# Patient Record
Sex: MALE | Race: BLACK OR AFRICAN AMERICAN | NOT HISPANIC OR LATINO
[De-identification: names, ages, dates, MRNs, and addresses within clinical notes are randomized per-mention and may not be internally consistent; named-entity substitution may affect disease eponyms.]

---

## 2021-08-15 ENCOUNTER — NON-APPOINTMENT (OUTPATIENT)
Age: 31
End: 2021-08-15

## 2021-08-16 ENCOUNTER — NON-APPOINTMENT (OUTPATIENT)
Age: 31
End: 2021-08-16

## 2021-08-16 ENCOUNTER — APPOINTMENT (OUTPATIENT)
Dept: INTERNAL MEDICINE | Facility: CLINIC | Age: 31
End: 2021-08-16
Payer: COMMERCIAL

## 2021-08-16 VITALS
HEART RATE: 77 BPM | BODY MASS INDEX: 34.4 KG/M2 | WEIGHT: 227 LBS | HEIGHT: 68 IN | SYSTOLIC BLOOD PRESSURE: 125 MMHG | OXYGEN SATURATION: 95 % | DIASTOLIC BLOOD PRESSURE: 81 MMHG | TEMPERATURE: 97.4 F

## 2021-08-16 DIAGNOSIS — Z82.49 FAMILY HISTORY OF ISCHEMIC HEART DISEASE AND OTHER DISEASES OF THE CIRCULATORY SYSTEM: ICD-10-CM

## 2021-08-16 DIAGNOSIS — Z00.00 ENCOUNTER FOR GENERAL ADULT MEDICAL EXAMINATION W/OUT ABNORMAL FINDINGS: ICD-10-CM

## 2021-08-16 DIAGNOSIS — Z11.59 ENCOUNTER FOR SCREENING FOR OTHER VIRAL DISEASES: ICD-10-CM

## 2021-08-16 DIAGNOSIS — Z83.3 FAMILY HISTORY OF DIABETES MELLITUS: ICD-10-CM

## 2021-08-16 DIAGNOSIS — Z80.0 FAMILY HISTORY OF MALIGNANT NEOPLASM OF DIGESTIVE ORGANS: ICD-10-CM

## 2021-08-16 DIAGNOSIS — Z82.1 FAMILY HISTORY OF BLINDNESS AND VISUAL LOSS: ICD-10-CM

## 2021-08-16 DIAGNOSIS — Z78.9 OTHER SPECIFIED HEALTH STATUS: ICD-10-CM

## 2021-08-16 DIAGNOSIS — Z11.3 ENCOUNTER FOR SCREENING FOR INFECTIONS WITH A PREDOMINANTLY SEXUAL MODE OF TRANSMISSION: ICD-10-CM

## 2021-08-16 DIAGNOSIS — J30.2 OTHER SEASONAL ALLERGIC RHINITIS: ICD-10-CM

## 2021-08-16 PROCEDURE — 36415 COLL VENOUS BLD VENIPUNCTURE: CPT

## 2021-08-16 PROCEDURE — 99385 PREV VISIT NEW AGE 18-39: CPT | Mod: 25

## 2021-08-16 NOTE — HISTORY OF PRESENT ILLNESS
[FreeTextEntry1] : annual exam/est care [de-identified] : Mr. Webster is a 31 Y/M \par \par -lower back tightness,non-radiating\par -since doing lower back extension 4 months ago\par -and left leg tightness connected  \par - improves w/ movement\par - worse w/ prolonged sitting\par \par HCM\par - would like covid vax, is waiting for location that he and his fiancee can go to\par - does not trust pharmacy, had bad experience

## 2021-08-16 NOTE — PHYSICAL EXAM
[No Acute Distress] : no acute distress [Well Nourished] : well nourished [Well Developed] : well developed [Well-Appearing] : well-appearing [Normal Sclera/Conjunctiva] : normal sclera/conjunctiva [EOMI] : extraocular movements intact [Normal Outer Ear/Nose] : the outer ears and nose were normal in appearance [No Respiratory Distress] : no respiratory distress  [No Accessory Muscle Use] : no accessory muscle use [Clear to Auscultation] : lungs were clear to auscultation bilaterally [Normal Rate] : normal rate  [Regular Rhythm] : with a regular rhythm [Normal S1, S2] : normal S1 and S2 [No Murmur] : no murmur heard [No Edema] : there was no peripheral edema [No Extremity Clubbing/Cyanosis] : no extremity clubbing/cyanosis [Soft] : abdomen soft [Non Tender] : non-tender [Non-distended] : non-distended [No Masses] : no abdominal mass palpated [No HSM] : no HSM [No CVA Tenderness] : no CVA  tenderness [No Spinal Tenderness] : no spinal tenderness [No Joint Swelling] : no joint swelling [Grossly Normal Strength/Tone] : grossly normal strength/tone [No Rash] : no rash [Coordination Grossly Intact] : coordination grossly intact [No Focal Deficits] : no focal deficits [Normal Gait] : normal gait [Normal Affect] : the affect was normal [Alert and Oriented x3] : oriented to person, place, and time [Normal Mood] : the mood was normal [Normal Insight/Judgement] : insight and judgment were intact

## 2021-08-17 LAB
25(OH)D3 SERPL-MCNC: 24 NG/ML
ALBUMIN SERPL ELPH-MCNC: 4.2 G/DL
ALP BLD-CCNC: 117 U/L
ALT SERPL-CCNC: 20 U/L
ANION GAP SERPL CALC-SCNC: 10 MMOL/L
APPEARANCE: CLEAR
AST SERPL-CCNC: 27 U/L
BASOPHILS # BLD AUTO: 0.03 K/UL
BASOPHILS NFR BLD AUTO: 0.6 %
BILIRUB SERPL-MCNC: 0.3 MG/DL
BILIRUBIN URINE: NEGATIVE
BLOOD URINE: NEGATIVE
BUN SERPL-MCNC: 11 MG/DL
C TRACH RRNA SPEC QL NAA+PROBE: NOT DETECTED
CALCIUM SERPL-MCNC: 9.6 MG/DL
CHLORIDE SERPL-SCNC: 104 MMOL/L
CHOLEST SERPL-MCNC: 178 MG/DL
CO2 SERPL-SCNC: 25 MMOL/L
COLOR: YELLOW
COVID-19 SPIKE DOMAIN ANTIBODY INTERPRETATION: POSITIVE
CREAT SERPL-MCNC: 1.3 MG/DL
EOSINOPHIL # BLD AUTO: 0.09 K/UL
EOSINOPHIL NFR BLD AUTO: 1.9 %
ESTIMATED AVERAGE GLUCOSE: 120 MG/DL
GLUCOSE QUALITATIVE U: NEGATIVE
GLUCOSE SERPL-MCNC: 95 MG/DL
HBA1C MFR BLD HPLC: 5.8 %
HCT VFR BLD CALC: 49.2 %
HDLC SERPL-MCNC: 44 MG/DL
HGB BLD-MCNC: 15.5 G/DL
HIV1+2 AB SPEC QL IA.RAPID: NONREACTIVE
IMM GRANULOCYTES NFR BLD AUTO: 0.2 %
KETONES URINE: NEGATIVE
LDLC SERPL CALC-MCNC: 111 MG/DL
LEUKOCYTE ESTERASE URINE: NEGATIVE
LYMPHOCYTES # BLD AUTO: 1.49 K/UL
LYMPHOCYTES NFR BLD AUTO: 31.8 %
MAN DIFF?: NORMAL
MCHC RBC-ENTMCNC: 29.5 PG
MCHC RBC-ENTMCNC: 31.5 GM/DL
MCV RBC AUTO: 93.5 FL
MONOCYTES # BLD AUTO: 0.39 K/UL
MONOCYTES NFR BLD AUTO: 8.3 %
N GONORRHOEA RRNA SPEC QL NAA+PROBE: NOT DETECTED
NEUTROPHILS # BLD AUTO: 2.68 K/UL
NEUTROPHILS NFR BLD AUTO: 57.2 %
NITRITE URINE: NEGATIVE
NONHDLC SERPL-MCNC: 134 MG/DL
PH URINE: 6
PLATELET # BLD AUTO: 229 K/UL
POTASSIUM SERPL-SCNC: 4.4 MMOL/L
PROT SERPL-MCNC: 6.8 G/DL
PROTEIN URINE: NORMAL
PSA SERPL-MCNC: 0.91 NG/ML
RBC # BLD: 5.26 M/UL
RBC # FLD: 13.6 %
SARS-COV-2 AB SERPL IA-ACNC: 143 U/ML
SODIUM SERPL-SCNC: 139 MMOL/L
SOURCE AMPLIFICATION: NORMAL
SPECIFIC GRAVITY URINE: 1.02
T PALLIDUM AB SER QL IA: NEGATIVE
TRIGL SERPL-MCNC: 116 MG/DL
TSH SERPL-ACNC: 0.78 UIU/ML
UROBILINOGEN URINE: NORMAL
WBC # FLD AUTO: 4.69 K/UL

## 2021-08-30 LAB — HLA-B27 RELATED AG QL: NEGATIVE

## 2021-09-01 ENCOUNTER — APPOINTMENT (OUTPATIENT)
Dept: ORTHOPEDIC SURGERY | Facility: CLINIC | Age: 31
End: 2021-09-01
Payer: COMMERCIAL

## 2021-09-01 PROCEDURE — 99203 OFFICE O/P NEW LOW 30 MIN: CPT

## 2021-09-01 NOTE — HISTORY OF PRESENT ILLNESS
[de-identified] : Reilly is a 30 yo who comes in with low back pain that started around May 1, 2021.  He felt that initially doing back extension exercises and felt a tightness in the low back.  When he did the exercises again he developed more tightness in the back which now has been relatively constant.  The tightness or pain can get worse when he carries any weight or does weight lifting.  He never had any prior back issues.  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  Pain can be an 8 out of 10 at times carrying something heavy.  It is better lying down and applying heat.  He has not done any physical therapy or had any other treatment.  No pain down the legs although sometimes his knees are little sore and achy.  He had gained weight during Covid and decided to start running and ran 8 miles without any base and had some knee and back discomfort after that a while ago.  He has not been running recently.  Running can aggravate the back now too.  He has not been taking any medication.

## 2021-09-01 NOTE — PHYSICAL EXAM
[LE] : Sensory: Intact in bilateral lower extremities [Normal RLE] : Right Lower Extremity: No scars, rashes, lesions, ulcers, skin intact [Normal LLE] : Left Lower Extremity: No scars, rashes, lesions, ulcers, skin intact [Normal Touch] : sensation intact for touch [Normal] : Alert and in no acute distress [de-identified] : Low back and lower extremities\par He walks with a normal gait.\par He can walk on his toes and heels.\par Flatfoot deformity bilaterally.\par Deep tendon reflexes 2+ bilateral patella and Achilles.\par Negative straight leg raise to 80 degrees bilaterally without pain.\par Negative Jerrod.\par No significant tenderness in the back although where he feels pain is around the L4 level.\par Forward flexion he can reach the floor without pain or difficulty.\par Sensation is intact.  [de-identified] : Overweight [de-identified] : \par I reviewed x-rays taken at [facility] radiology of the lumbar spine AP and lateral views on August 16, 2021 which are relatively unremarkable.  There may be a very subtle curvature on the AP view.  On the lateral view the disc spaces are well-maintained and there is normal alignment and no spondylolisthesis or spondylolysis visualized

## 2021-09-01 NOTE — ASSESSMENT
[FreeTextEntry1] : 31-year-old with low back pain over the last several months that started with doing some extension exercises but also had done some running.\par There are no neurologic deficits or radicular symptoms.\par I recommended first trying a good course of physical therapy which she is starting later today.  Heat and ice.  I recommended taking an anti-inflammatory for couple weeks and prescribed diclofenac 50 mg twice daily with meals.\par He should avoid any heavy lifting or any activity that causes any pain during or after.  He should do very gentle nonstrenuous exercises for the time being and low impact.\par If the pain is getting worse with therapy or if it does not get better then I would have him go for an MRI.\par We also talked about building up slowly when he does  a new exercise such as running to try to avoid overuse injuries.\par Follow-up in about 6 weeks if he is not better.

## 2021-10-13 ENCOUNTER — APPOINTMENT (OUTPATIENT)
Dept: ORTHOPEDIC SURGERY | Facility: CLINIC | Age: 31
End: 2021-10-13
Payer: COMMERCIAL

## 2021-10-13 DIAGNOSIS — M54.50 LOW BACK PAIN, UNSPECIFIED: ICD-10-CM

## 2021-10-13 PROCEDURE — 99213 OFFICE O/P EST LOW 20 MIN: CPT

## 2021-10-13 NOTE — HISTORY OF PRESENT ILLNESS
[de-identified] : Reilly is a 30 yo who comes in for f/u for the low back pain that started around May 1, 2021.  He felt that initially doing back extension exercises and felt a tightness in the low back.\par His back has gotten somewhat better with the physical therapy and time.  He took a little bit of the diclofenac which I had prescribed but it made him tired so he only took it for several days.  With the therapy his pain is more localized to the low back a little bit more on the left than right side.  He sometimes feels some stiffness in his knees that he thought might be related.\par No numbness or tingling or radiating pain otherwise.\par Standing leaning forward slightly can be very painful intermittently.

## 2021-10-13 NOTE — PHYSICAL EXAM
[LE] : Sensory: Intact in bilateral lower extremities [Normal RLE] : Right Lower Extremity: No scars, rashes, lesions, ulcers, skin intact [Normal LLE] : Left Lower Extremity: No scars, rashes, lesions, ulcers, skin intact [Normal Touch] : sensation intact for touch [Normal] : Alert and in no acute distress [de-identified] : Low back and lower extremities\par He walks with a normal gait.\par He can walk on his toes and heels.\par Flatfoot deformity bilaterally.\par Lumbar range of motion is with forward flexion to his ankles without significant pain.\par Deep tendon reflexes 2+ bilateral patella and Achilles.\par Negative straight leg raise to 80 degrees bilaterally without pain.\par Negative Jerrod.\par No significant tenderness in the back although where he feels pain is around the L4 level.\par Sensation is intact.\par Bilateral knee range of motion 0 to 135 degrees.  No effusion.  No tenderness or pain.  Knees are stable [de-identified] : Overweight [de-identified] : \par x-rays taken at [facility] radiology of the lumbar spine AP and lateral views on August 16, 2021 were relatively unremarkable.  There may be a very subtle curvature on the AP view.  On the lateral view the disc spaces are well-maintained and there is normal alignment and no spondylolisthesis or spondylolysis visualized

## 2021-10-13 NOTE — ASSESSMENT
[FreeTextEntry1] : 31-year-old with low back pain over the past 5 months that started with doing some extension exercises but also had done some running.\par There are no neurologic deficits or radicular symptoms.\par The therapy is helping and he should continue with the physical therapy and home exercises.  Heat and ice.  He should be careful bending and lifting and avoid any aggravating position or activity.  Since he could not tolerate the diclofenac I prescribed Celebrex instead to take 1 a day for couple weeks to see if this works better.\par Otherwise he could try the diclofenac but just take it with dinner at night so if it makes him tired its not as much of an issue.\par He should not take both.\par Tylenol would be an alternative.\par He will follow-up after doing another 6 weeks of therapy if his pain is not gone in which case I would work it up further likely with an MRI.

## 2022-01-07 ENCOUNTER — NON-APPOINTMENT (OUTPATIENT)
Age: 32
End: 2022-01-07

## 2022-01-07 ENCOUNTER — APPOINTMENT (OUTPATIENT)
Dept: OPHTHALMOLOGY | Facility: CLINIC | Age: 32
End: 2022-01-07
Payer: COMMERCIAL

## 2022-01-07 PROCEDURE — 92002 INTRM OPH EXAM NEW PATIENT: CPT

## 2022-01-07 PROCEDURE — 92083 EXTENDED VISUAL FIELD XM: CPT

## 2022-01-07 PROCEDURE — 92134 CPTRZ OPH DX IMG PST SGM RTA: CPT

## 2022-01-07 PROCEDURE — 76514 ECHO EXAM OF EYE THICKNESS: CPT

## 2022-01-07 PROCEDURE — 92015 DETERMINE REFRACTIVE STATE: CPT

## 2022-06-10 ENCOUNTER — APPOINTMENT (OUTPATIENT)
Dept: INTERNAL MEDICINE | Facility: CLINIC | Age: 32
End: 2022-06-10
Payer: COMMERCIAL

## 2022-06-10 VITALS
RESPIRATION RATE: 16 BRPM | OXYGEN SATURATION: 100 % | WEIGHT: 215 LBS | SYSTOLIC BLOOD PRESSURE: 117 MMHG | HEART RATE: 75 BPM | HEIGHT: 68 IN | TEMPERATURE: 98 F | BODY MASS INDEX: 32.58 KG/M2 | DIASTOLIC BLOOD PRESSURE: 72 MMHG

## 2022-06-10 DIAGNOSIS — S54.02XS INJURY OF ULNAR NERVE AT FOREARM LEVEL, LEFT ARM, SEQUELA: ICD-10-CM

## 2022-06-10 PROCEDURE — 99214 OFFICE O/P EST MOD 30 MIN: CPT

## 2022-06-10 RX ORDER — DICLOFENAC SODIUM 50 MG/1
50 TABLET, DELAYED RELEASE ORAL TWICE DAILY
Qty: 30 | Refills: 1 | Status: COMPLETED | COMMUNITY
Start: 2021-09-01 | End: 2022-06-10

## 2022-06-10 RX ORDER — ALBUTEROL SULFATE 90 UG/1
108 (90 BASE) INHALANT RESPIRATORY (INHALATION)
Qty: 1 | Refills: 3 | Status: COMPLETED | COMMUNITY
Start: 2021-08-16 | End: 2022-06-10

## 2022-06-10 RX ORDER — CELECOXIB 200 MG/1
200 CAPSULE ORAL DAILY
Qty: 20 | Refills: 0 | Status: COMPLETED | COMMUNITY
Start: 2021-10-13 | End: 2022-06-10

## 2022-06-10 NOTE — PHYSICAL EXAM
[Normal] : no acute distress, well nourished, well developed and well-appearing [No Respiratory Distress] : no respiratory distress  [de-identified] : LEFT arm in a brace, decreased strength and sensation over left 3,4,5th digits
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
## 2022-10-25 ENCOUNTER — APPOINTMENT (OUTPATIENT)
Dept: PSYCHIATRY | Facility: CLINIC | Age: 32
End: 2022-10-25

## 2022-10-25 DIAGNOSIS — F43.23 ADJUSTMENT DISORDER WITH MIXED ANXIETY AND DEPRESSED MOOD: ICD-10-CM

## 2022-10-25 PROCEDURE — 90791 PSYCH DIAGNOSTIC EVALUATION: CPT | Mod: 95

## 2022-10-28 PROBLEM — F43.23 ADJUSTMENT DISORDER WITH MIXED ANXIETY AND DEPRESSED MOOD: Status: ACTIVE | Noted: 2022-10-28

## 2022-10-28 NOTE — PLAN
[Admit to Program     (Add Program Admission information to a new column in the Admit/Discharge Flowsheet)] : Admit to program [Every ___ week(s)] : Psychotherapy: Every [unfilled] week(s) [Individual Therapy] : Individual Therapy [FreeTextEntry4] : Pt will be further assessed for PTSD and other symptoms to confirm whether he meets criteria for PTSD or other comorbidities (MDD).  Will collaboratively determine plan for treatment following comprehensive PTSD assessment.

## 2022-10-28 NOTE — FAMILY HISTORY
[FreeTextEntry1] : Pt reported that he was raised by his mother who is legally blind. He stated that at a young age he was always watch for his own and her safety. He stated that because of this, he has engaged in more vigilant behavior around his safety (checking door/window locks, watching the door, checking for exits). He reported that he currently lives with his fiance in [place], [place]. He stated that they are planning to get  in [place] in November.

## 2022-10-28 NOTE — SOCIAL HISTORY
[FreeTextEntry1] : Pt reported that he has friends with whom he spends time with however, he noted that he does not feel like he is able to talk about what happened to him with them.
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
## 2022-10-28 NOTE — PHYSICAL EXAM
[Well groomed] : well groomed [Average] : average [Cooperative] : cooperative [Euthymic] : euthymic [Full] : full [Clear] : clear [Linear/Goal Directed] : linear/goal directed [None] : none [None Reported] : none reported [WNL] : within normal limits [4] : 4 [5] : 5 [1] : 1 [6] : 6 [0] : 0 [FreeTextEntry1] : 25
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
## 2022-10-28 NOTE — RISK ASSESSMENT
[Clinical Interview] : Clinical Interview [No] : No [No known suicide factors] : No known suicide factors [Depressed mood/Anhedonia] : depressed mood/anhedonia [Identifies reasons for living] : identifies reasons for living [Supportive social network of family or friends] : supportive social network of family or friends [Ability to cope with stress] : ability to cope with stress [Responsibility to children, family, or others] : responsibility to children, family, or others [Engaged in work or school] : engaged in work or school [None in the patient's lifetime] : None in the patient's lifetime [None Known] : none known [No known risk factors] : No known risk factors

## 2022-10-28 NOTE — END OF VISIT
[Time Spent: ___ minutes] : I have spent [unfilled] minutes of time on the encounter. [Licensed Clinician] : Licensed Clinician

## 2022-11-01 ENCOUNTER — APPOINTMENT (OUTPATIENT)
Dept: PSYCHIATRY | Facility: CLINIC | Age: 32
End: 2022-11-01

## 2022-11-01 PROCEDURE — 90837 PSYTX W PT 60 MINUTES: CPT | Mod: 95

## 2022-11-01 NOTE — END OF VISIT
[Individual Psychotherapy for 53+ minutes] : Individual Psychotherapy for 53+ minutes  [Teletherapy Service Provided] : The services provided in this session were delivered via tele-therapy [Licensed Clinician] : Licensed Clinician

## 2022-11-01 NOTE — PLAN
[FreeTextEntry2] : This writer and pt collaboratively identified the following psychotherapy goals:\par Problem 1: PTSD sx's (i.e., intrusive, unwanted trauma-related thoughts and images; trauma-related hypervigilance; trauma-related alterations in mood)\par Goal 1: Reduce PTSD sx's by 20%, as measured by PCL-5\par Problem 2: Trauma-related fear and anger\par Goal 1: process emotions related to trauma event\par Goal 2: better understand how trauma event has affected current functioning and develop coping strategies to improve functioning. \par Goal 3: Determine after rapport has been established if tf-ebp (CPT, PE) is indicated/if pt is interested in engaging. [Psychoeducation] : Psychoeducation  [de-identified] : This session focused on further assessing pt's presence of trauma-related symptoms by completing the CAPS-5 for PTSD. Upon completion of assessment measure, it is indicated that pt is positive for PTSD. This writer provided psycho-education and feedback to pt regarding results of the assessment. Pt indicated that this was not a surprise and that this confirms what he has been feeling. This writer educated pt on some of the common responses to traumatic events, and how individuals often recover or get "stuck" in recovery and develop PTSD. Informed pt that avoidance is a primary factor in development of PTSD and discussed form of treatment (CPT, PE). Pt indicated uncertainty around which EBP he was more interested in. This writer suggested that they continue to meet weekly after his wedding to establish rapport and work on coping strategies related to his mood, hypervigilance, and feelings of anger/fear. Pt agreed with this plan. Will continue to assess PTSD symptoms with PCL-5, and will determine after several sessions which treatment is indicated. [Return in ____ week(s)] : Return in [unfilled] week(s) [FreeTextEntry1] : Pt will engage in weekly psychotherapy sessions to target PTSD sx's, specifically alterations in mood, hypervigilance, and processing trauma-related thoughts/emotions (fear, anger).

## 2022-11-01 NOTE — PHYSICAL EXAM
[Well groomed] : well groomed [Average] : average [Cooperative] : cooperative [Depressed] : depressed [Full] : full [Clear] : clear [Linear/Goal Directed] : linear/goal directed [None] : none [None Reported] : none reported [WNL] : within normal limits

## 2022-11-01 NOTE — REASON FOR VISIT
[Home] : at home, [unfilled] , at the time of the visit. [Other Location: e.g. Home (Enter Location, City,State)___] : at [unfilled] [Self] : self [FreeTextEntry2] : Reilly Webster [Patient] : Patient

## 2022-11-15 ENCOUNTER — APPOINTMENT (OUTPATIENT)
Dept: PSYCHIATRY | Facility: CLINIC | Age: 32
End: 2022-11-15

## 2022-11-15 PROCEDURE — 90837 PSYTX W PT 60 MINUTES: CPT | Mod: 95
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
## 2022-11-15 NOTE — REASON FOR VISIT
[Home] : at home, [unfilled] , at the time of the visit. [Other Location: e.g. Home (Enter Location, City,State)___] : at [unfilled] [Self] : self [This encounter was initiated by telehealth (audio with video) and converted to telephone (audio only) due to technical difficulties.] : This encounter was initiated by telehealth (audio with video) and converted to telephone (audio only) due to technical difficulties. [FreeTextEntry2] : Reilly Webster [Patient] : Patient

## 2022-11-15 NOTE — PHYSICAL EXAM
[Well groomed] : well groomed [Average] : average [Cooperative] : cooperative [Euthymic] : euthymic [Constricted] : constricted [Clear] : clear [Linear/Goal Directed] : linear/goal directed [None] : none [None Reported] : none reported [WNL] : within normal limits

## 2022-11-22 ENCOUNTER — APPOINTMENT (OUTPATIENT)
Dept: PSYCHIATRY | Facility: CLINIC | Age: 32
End: 2022-11-22

## 2022-11-22 PROCEDURE — 90837 PSYTX W PT 60 MINUTES: CPT | Mod: 95
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
## 2022-11-22 NOTE — PHYSICAL EXAM
[Well groomed] : well groomed [Average] : average [Cooperative] : cooperative [Euthymic] : euthymic [Full] : full [Clear] : clear [Linear/Goal Directed] : linear/goal directed [None] : none [None Reported] : none reported [WNL] : within normal limits

## 2022-11-29 ENCOUNTER — APPOINTMENT (OUTPATIENT)
Dept: PSYCHIATRY | Facility: CLINIC | Age: 32
End: 2022-11-29

## 2022-11-29 PROCEDURE — 90837 PSYTX W PT 60 MINUTES: CPT | Mod: 95
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
## 2022-11-29 NOTE — REASON FOR VISIT
[Home] : at home, [unfilled] , at the time of the visit. [Other Location: e.g. Home (Enter Location, City,State)___] : at [unfilled] [Self] : self [FreeTextEntry2] : Reilly Webster [Patient] : Patient [FreeTextEntry1] : PTSD symptoms

## 2022-12-06 ENCOUNTER — APPOINTMENT (OUTPATIENT)
Dept: PSYCHIATRY | Facility: CLINIC | Age: 32
End: 2022-12-06

## 2022-12-06 PROCEDURE — 90837 PSYTX W PT 60 MINUTES: CPT | Mod: 95
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
## 2022-12-07 NOTE — REASON FOR VISIT
[Home] : at home, [unfilled] , at the time of the visit. [Other Location: e.g. Home (Enter Location, City,State)___] : at [unfilled] [Self] : self [This encounter was initiated by telehealth (audio with video) and converted to telephone (audio only) due to technical difficulties.] : This encounter was initiated by telehealth (audio with video) and converted to telephone (audio only) due to technical difficulties. [FreeTextEntry2] : Reilly Webster [Patient] : Patient [FreeTextEntry1] : PTSD symptoms

## 2022-12-07 NOTE — PHYSICAL EXAM
[Well groomed] : well groomed [Average] : average [Cooperative] : cooperative [Depressed] : depressed no [Full] : full [Clear] : clear [Linear/Goal Directed] : linear/goal directed [None] : none [None Reported] : none reported [WNL] : within normal limits

## 2022-12-13 ENCOUNTER — APPOINTMENT (OUTPATIENT)
Dept: PSYCHIATRY | Facility: CLINIC | Age: 32
End: 2022-12-13

## 2022-12-13 PROCEDURE — 90834 PSYTX W PT 45 MINUTES: CPT | Mod: 95

## 2022-12-14 NOTE — REASON FOR VISIT
[Home] : at home, [unfilled] , at the time of the visit. [Other Location: e.g. Home (Enter Location, City,State)___] : at [unfilled] [Self] : self [FreeTextEntry2] : Reilly Webster [Patient] : Patient [FreeTextEntry1] : Symptoms related to PTSD

## 2022-12-14 NOTE — PHYSICAL EXAM
[Well groomed] : well groomed [Average] : average [Accelerated] : accelerated [Cooperative] : cooperative [Euthymic] : euthymic [Depressed] : depressed [Full] : full [Clear] : clear [Linear/Goal Directed] : linear/goal directed [None] : none [None Reported] : none reported [WNL] : within normal limits
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
## 2022-12-20 ENCOUNTER — APPOINTMENT (OUTPATIENT)
Dept: PSYCHIATRY | Facility: CLINIC | Age: 32
End: 2022-12-20

## 2022-12-20 PROCEDURE — 90837 PSYTX W PT 60 MINUTES: CPT | Mod: 95

## 2022-12-20 NOTE — PHYSICAL EXAM
[Well groomed] : well groomed [Average] : average [Cooperative] : cooperative [Depressed] : depressed [Constricted] : constricted [Clear] : clear [Linear/Goal Directed] : linear/goal directed [None] : none [None Reported] : none reported [WNL] : within normal limits
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
## 2023-01-03 ENCOUNTER — APPOINTMENT (OUTPATIENT)
Dept: PSYCHIATRY | Facility: CLINIC | Age: 33
End: 2023-01-03
Payer: COMMERCIAL

## 2023-01-03 PROCEDURE — 90837 PSYTX W PT 60 MINUTES: CPT | Mod: 95

## 2023-01-04 NOTE — REASON FOR VISIT
[Home] : at home, [unfilled] , at the time of the visit. [Other Location: e.g. Home (Enter Location, City,State)___] : at [unfilled] [Self] : self [FreeTextEntry2] : Reilly Webster [Patient] : Patient [FreeTextEntry1] : PTSD symptoms; stress related to work
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
## 2023-01-10 ENCOUNTER — APPOINTMENT (OUTPATIENT)
Dept: PSYCHIATRY | Facility: CLINIC | Age: 33
End: 2023-01-10

## 2023-01-12 ENCOUNTER — APPOINTMENT (OUTPATIENT)
Dept: OPHTHALMOLOGY | Facility: CLINIC | Age: 33
End: 2023-01-12

## 2023-01-17 ENCOUNTER — APPOINTMENT (OUTPATIENT)
Dept: PSYCHIATRY | Facility: CLINIC | Age: 33
End: 2023-01-17
Payer: COMMERCIAL

## 2023-01-17 PROCEDURE — 90837 PSYTX W PT 60 MINUTES: CPT | Mod: 95

## 2023-01-17 NOTE — PLAN
[FreeTextEntry2] : This writer and pt collaboratively identified the following psychotherapy goals:\par Problem 1: PTSD sx's (i.e., intrusive, unwanted trauma-related thoughts and images; trauma-related hypervigilance; trauma-related alterations in mood)\par Goal 1: Reduce PTSD sx's by 20%, as measured by PCL-5\par Problem 2: Trauma-related fear and anger\par Goal 1: process emotions related to trauma event\par Goal 2: better understand how trauma event has affected current functioning and develop coping strategies to improve functioning. \par Goal 3: Determine after rapport has been established if tf-ebp (CPT, PE) is indicated/if pt is interested in engaging. [Dialectical Behavior Therapy] : Dialectical Behavior Therapy  [de-identified] : THis writer met with pt for individual therapy. THis session focused on identifying thoughts and feelings related to being unhappy with job and expressing this to his wife. Pt stated that he struggles to express how he is feeling to others and himself. He indicated that he can do so in therapy with this provider, but struggles to find the words with others. He noted feeling like he has to over prepare, and have a script. Discussed ways that they could work on pt becoming more comfortable with modeling and role plays. Additionally, this writer encouraged pt to consider his goals for treatment and to discuss in next session if any adjustments need to be made. [Recommended Frequency of Visits: ____] : Recommended frequency of visits: [unfilled] [Return in ____ week(s)] : Return in [unfilled] week(s)

## 2023-01-24 ENCOUNTER — APPOINTMENT (OUTPATIENT)
Dept: PSYCHIATRY | Facility: CLINIC | Age: 33
End: 2023-01-24
Payer: COMMERCIAL

## 2023-01-24 PROCEDURE — 90837 PSYTX W PT 60 MINUTES: CPT | Mod: 95

## 2023-01-24 NOTE — PHYSICAL EXAM
[Well groomed] : well groomed [Average] : average [Cooperative] : cooperative [Euthymic] : euthymic [Irritable] : irritable [Constricted] : constricted [Clear] : clear [Linear/Goal Directed] : linear/goal directed [None] : none [None Reported] : none reported [WNL] : within normal limits
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
## 2023-01-31 ENCOUNTER — APPOINTMENT (OUTPATIENT)
Dept: PSYCHIATRY | Facility: CLINIC | Age: 33
End: 2023-01-31
Payer: COMMERCIAL

## 2023-01-31 PROCEDURE — 90837 PSYTX W PT 60 MINUTES: CPT | Mod: 95

## 2023-01-31 NOTE — PLAN
[FreeTextEntry2] : This writer and pt collaboratively identified the following psychotherapy goals:\par Problem 1: PTSD sx's (i.e., intrusive, unwanted trauma-related thoughts and images; trauma-related hypervigilance; trauma-related alterations in mood)\par Goal 1: Reduce PTSD sx's by 20%, as measured by PCL-5\par Problem 2: Trauma-related fear and anger\par Goal 1: process emotions related to trauma event\par Goal 2: better understand how trauma event has affected current functioning and develop coping strategies to improve functioning. \par Goal 3: Determine after rapport has been established if tf-ebp (CPT, PE) is indicated/if pt is interested in engaging. [Prolonged Exposure] : Prolonged Exposure  [de-identified] : This writer met with patient for individual therapy. This session focused on starting Prolonged Exposure treatment. Introduced patient to the materials that would be used, identified how he would be audio taping the sessions to listen to them back. Introduced patient to the rationale for treatment and began common reactions to trauma. Will continue with common reactions to trauma and in-vivo exposure in next session. [Recommended Frequency of Visits: ____] : Recommended frequency of visits: [unfilled] [Return in ____ week(s)] : Return in [unfilled] week(s)

## 2023-01-31 NOTE — REASON FOR VISIT
[Home] : at home, [unfilled] , at the time of the visit. [Other Location: e.g. Home (Enter Location, City,State)___] : at [unfilled] [Self] : self [FreeTextEntry2] : Reilly Webster [Patient] : Patient [FreeTextEntry1] : PTSD sx's

## 2023-02-07 ENCOUNTER — APPOINTMENT (OUTPATIENT)
Dept: PSYCHIATRY | Facility: CLINIC | Age: 33
End: 2023-02-07
Payer: COMMERCIAL

## 2023-02-07 PROCEDURE — 90834 PSYTX W PT 45 MINUTES: CPT | Mod: 95

## 2023-02-07 NOTE — PHYSICAL EXAM
[Well groomed] : well groomed [Average] : average [Cooperative] : cooperative [Euthymic] : euthymic [Constricted] : constricted [Clear] : clear [Linear/Goal Directed] : linear/goal directed [None] : none [None Reported] : none reported [WNL] : within normal limits [FreeTextEntry8] : somewhat low mood getting rid of Xanax and reducing alcohol consumption

## 2023-02-07 NOTE — PLAN
[FreeTextEntry2] : This writer and pt collaboratively identified the following psychotherapy goals:\par Problem 1: PTSD sx's (i.e., intrusive, unwanted trauma-related thoughts and images; trauma-related hypervigilance; trauma-related alterations in mood)\par Goal 1: Reduce PTSD sx's by 20%, as measured by PCL-5\par Problem 2: Trauma-related fear and anger\par Goal 1: process emotions related to trauma event\par Goal 2: better understand how trauma event has affected current functioning and develop coping strategies to improve functioning. \par Goal 3: Determine after rapport has been established if tf-ebp (CPT, PE) is indicated/if pt is interested in engaging. [Prolonged Exposure] : Prolonged Exposure  [de-identified] : This writer met with patient for individual therapy. This session focused on session 2A of prolonged exposure. Reviewed assigned homework. Introduced common reactions to trauma. Patient reported relating to many of the reactions, specifically the irritability, the hypervigilance, and issues with trust. Will continue with in-vivo exposure rationale in following session.\par \par PCL-5= 36\par PHQ9= 8 [Recommended Frequency of Visits: ____] : Recommended frequency of visits: [unfilled] [Return in ____ week(s)] : Return in [unfilled] week(s) [FreeTextEntry1] : Meet in 2 weeks due to patients request to not meet on valentines day.

## 2023-02-17 ENCOUNTER — APPOINTMENT (OUTPATIENT)
Dept: ORTHOPEDIC SURGERY | Facility: CLINIC | Age: 33
End: 2023-02-17
Payer: COMMERCIAL

## 2023-02-17 DIAGNOSIS — M25.571 PAIN IN RIGHT ANKLE AND JOINTS OF RIGHT FOOT: ICD-10-CM

## 2023-02-17 DIAGNOSIS — M67.979 UNSPECIFIED DISORDER OF SYNOVIUM AND TENDON, UNSPECIFIED ANKLE AND FOOT: ICD-10-CM

## 2023-02-17 DIAGNOSIS — M21.41 FLAT FOOT [PES PLANUS] (ACQUIRED), RIGHT FOOT: ICD-10-CM

## 2023-02-17 DIAGNOSIS — M21.42 FLAT FOOT [PES PLANUS] (ACQUIRED), RIGHT FOOT: ICD-10-CM

## 2023-02-17 DIAGNOSIS — Z78.9 OTHER SPECIFIED HEALTH STATUS: ICD-10-CM

## 2023-02-17 PROCEDURE — 99214 OFFICE O/P EST MOD 30 MIN: CPT

## 2023-02-17 PROCEDURE — 73610 X-RAY EXAM OF ANKLE: CPT | Mod: RT

## 2023-02-17 PROCEDURE — 73620 X-RAY EXAM OF FOOT: CPT | Mod: RT

## 2023-02-17 RX ORDER — NAPROXEN 500 MG/1
500 TABLET ORAL
Qty: 28 | Refills: 1 | Status: ACTIVE | COMMUNITY
Start: 2023-02-17 | End: 1900-01-01

## 2023-02-17 NOTE — ASSESSMENT
[FreeTextEntry1] : 32-year-old with recent right ankle pain more lateral than medial.  He does have pes planovalgus deformity and he may be getting some lateral impingement.  There is mild tenderness on the posterior tibial tendon which feels intact.\par He is getting some fixed changes in the foot from the flatfoot deformity.  I explained how over time the medial structures could stretch out further and deformity may progress or become more symptomatic.\par I recommended that he get off-the-shelf orthotics and will consider custom orthotics.  He should wear shoes with a firm medial heel counter and good support and cushion.  Warm soaks and ice.  I prescribed an anti-inflammatory that he will take for a couple weeks with food to see if this helps with the pain.  He should do some physical therapy and work on strengthening.\par He should stop doing weightbearing exercise but can ride a bike and do low impact or nonweightbearing exercise.\par Follow-up if its not better in the next 6 weeks.\par \par

## 2023-02-17 NOTE — PHYSICAL EXAM
[LE] : Sensory: Intact in bilateral lower extremities [Normal RLE] : Right Lower Extremity: No scars, rashes, lesions, ulcers, skin intact [Normal LLE] : Left Lower Extremity: No scars, rashes, lesions, ulcers, skin intact [Normal Touch] : sensation intact for touch [Normal] : Oriented to person, place, and time, insight and judgement were intact and the affect was normal [DP] : dorsalis pedis 2+ and symmetric bilaterally [de-identified] : RIGHT knee and ankle\par Nonantalgic gait.\par He can walk on his heels and toes but he has significant pes planovalgus deformity bilaterally and arches do not come up when he raises on his toes.  He can do a repetitive single heel raise but feels weaker and with more difficulty on the right than left and cannot lift his heel quite as high.\par To many toes sign.\par No edema, ecchymoses, erythema around the ankle.\par Tender mildly on the right posterior tibial tendon and mildly tender sinus Tarsi.\par Nontender elsewhere.\par 5/5 anterior tibial tendon, gastrocsoleus, peroneals, posterior tibial tendon, EHL.\par Sensation is intact in the foot.\par Supination forefoot not fully correctable.\par Foot is warm. [de-identified] : \par \par X-rays taken today of RIGHT foot/ankle weightbearing 5 views showed uncovering of the talus medially consistent with the pes planovalgus.  Flatfoot deformity seen on the lateral view.\par Mortise is intact.  No acute changes.

## 2023-02-17 NOTE — HISTORY OF PRESENT ILLNESS
[de-identified] : Mr. Webster is a 33 y/o who comes in for RIGHT ankle and Achilles pain that started about a month ago. He has had prior pain that had subsided quickly so this feels different. There was no specific injury but he does workout a lot and does stairmaster, incline treadmill. He feels soreness and his right ankle feels weak. He has been stretching and using ice but that has not made a difference. He is not taking any pain medications.  He never wore orthotics.  He has always had flat feet.

## 2023-02-21 ENCOUNTER — APPOINTMENT (OUTPATIENT)
Dept: PSYCHIATRY | Facility: CLINIC | Age: 33
End: 2023-02-21
Payer: COMMERCIAL

## 2023-02-21 PROCEDURE — 90837 PSYTX W PT 60 MINUTES: CPT | Mod: 95

## 2023-02-22 NOTE — PLAN
[FreeTextEntry2] : This writer and pt collaboratively identified the following psychotherapy goals:\par Problem 1: PTSD sx's (i.e., intrusive, unwanted trauma-related thoughts and images; trauma-related hypervigilance; trauma-related alterations in mood)\par Goal 1: Reduce PTSD sx's by 20%, as measured by PCL-5\par Problem 2: Trauma-related fear and anger\par Goal 1: process emotions related to trauma event\par Goal 2: better understand how trauma event has affected current functioning and develop coping strategies to improve functioning. \par Goal 3: Determine after rapport has been established if tf-ebp (CPT, PE) is indicated/if pt is interested in engaging. [Prolonged Exposure] : Prolonged Exposure  [de-identified] : This writer met with patient for individual therapy. THis session focused on session 2B invivo exposure rationale. Provided information to patient as to what to expect with in-vivo exposure and began creating list of situations that he currently avoids related to the trauma and otherwise. Reviewed SUDs with patient and identified his scale as well as grading the identified situations with their complementing SUDs level. Assigned patient homework. [Recommended Frequency of Visits: ____] : Recommended frequency of visits: [unfilled] [Return in ____ week(s)] : Return in [unfilled] week(s)

## 2023-02-22 NOTE — PHYSICAL EXAM
[Well groomed] : well groomed [Average] : average [Cooperative] : cooperative [Euthymic] : euthymic [Constricted] : constricted [Clear] : clear [Linear/Goal Directed] : linear/goal directed [None] : none [None Reported] : none reported [WNL] : within normal limits [FreeTextEntry8] : somewhat low mood

## 2023-02-28 ENCOUNTER — APPOINTMENT (OUTPATIENT)
Dept: PSYCHIATRY | Facility: CLINIC | Age: 33
End: 2023-02-28
Payer: COMMERCIAL

## 2023-02-28 PROCEDURE — 90837 PSYTX W PT 60 MINUTES: CPT | Mod: 95

## 2023-02-28 NOTE — PLAN
[FreeTextEntry2] : This writer and pt collaboratively identified the following psychotherapy goals:\par Problem 1: PTSD sx's (i.e., intrusive, unwanted trauma-related thoughts and images; trauma-related hypervigilance; trauma-related alterations in mood)\par Goal 1: Reduce PTSD sx's by 20%, as measured by PCL-5\par Problem 2: Trauma-related fear and anger\par Goal 1: process emotions related to trauma event\par Goal 2: better understand how trauma event has affected current functioning and develop coping strategies to improve functioning. \par Goal 3: Determine after rapport has been established if tf-ebp (CPT, PE) is indicated/if pt is interested in engaging. [Prolonged Exposure] : Prolonged Exposure  [de-identified] : This writer met with patient for individual therapy utilizing prolonged exposure. PCL-5= 35; PHQ-9= 12. This session focused on reviewing in-vivo exposures and developing a more comprehensive list. Pt described two recent situations in which he felt fear and he wanted to avoid. Identified SUDs throughout each situation and reviewed how not leaving was helpful in reducing the intensity of his distress naturally. Assigned in-vivo exposure homework. [Recommended Frequency of Visits: ____] : Recommended frequency of visits: [unfilled] [Return in ____ week(s)] : Return in [unfilled] week(s)

## 2023-02-28 NOTE — REASON FOR VISIT
[Home] : at home, [unfilled] , at the time of the visit. [Other Location: e.g. Home (Enter Location, City,State)___] : at [unfilled] [Self] : self [FreeTextEntry2] : Reilly Webster [Patient] : Patient [FreeTextEntry1] : PTSD sxs

## 2023-03-07 ENCOUNTER — APPOINTMENT (OUTPATIENT)
Dept: PSYCHIATRY | Facility: CLINIC | Age: 33
End: 2023-03-07
Payer: COMMERCIAL

## 2023-03-07 PROCEDURE — 90834 PSYTX W PT 45 MINUTES: CPT | Mod: 95

## 2023-03-07 NOTE — PLAN
[FreeTextEntry2] : This writer and pt collaboratively identified the following psychotherapy goals:\par Problem 1: PTSD sx's (i.e., intrusive, unwanted trauma-related thoughts and images; trauma-related hypervigilance; trauma-related alterations in mood)\par Goal 1: Reduce PTSD sx's by 20%, as measured by PCL-5\par Problem 2: Trauma-related fear and anger\par Goal 1: process emotions related to trauma event\par Goal 2: better understand how trauma event has affected current functioning and develop 3 coping strategies to improve functioning and utilize at least 3x/week. \par Goal 3: Determine after rapport has been established if tf-ebp (CPT, PE) is indicated/if pt is interested in engaging. [Prolonged Exposure] : Prolonged Exposure  [de-identified] : This writer met with patient for individual therapy. This session focused on reviewing PE homework and specific SUDs scores for in-vivo exposure assignments. Patient engaged in the activities 2times in the past week and recorded SUDs. Discussed value of frequent practice and started with what he is assigned by this writer as it is graded and intentional. Reviewed rationale for in-vivo exposure. Identified target start and end points for imaginal exposure and reviewed with patient what this would look like in following week. Assigned homework. [Recommended Frequency of Visits: ____] : Recommended frequency of visits: [unfilled] [Return in ____ week(s)] : Return in [unfilled] week(s)

## 2023-03-14 ENCOUNTER — APPOINTMENT (OUTPATIENT)
Dept: PSYCHIATRY | Facility: CLINIC | Age: 33
End: 2023-03-14
Payer: COMMERCIAL

## 2023-03-14 PROCEDURE — 90837 PSYTX W PT 60 MINUTES: CPT | Mod: 95

## 2023-03-15 NOTE — PHYSICAL EXAM
[Well groomed] : well groomed [Average] : average [Cooperative] : cooperative [Full] : full [Clear] : clear [Linear/Goal Directed] : linear/goal directed [None] : none [None Reported] : none reported [WNL] : within normal limits [FreeTextEntry8] : low mood
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
## 2023-03-21 ENCOUNTER — APPOINTMENT (OUTPATIENT)
Dept: PSYCHIATRY | Facility: CLINIC | Age: 33
End: 2023-03-21
Payer: COMMERCIAL

## 2023-03-21 PROCEDURE — 90837 PSYTX W PT 60 MINUTES: CPT | Mod: 95

## 2023-03-21 NOTE — PLAN
[FreeTextEntry2] : This writer and pt collaboratively identified the following psychotherapy goals:\par Problem 1: PTSD sx's (i.e., intrusive, unwanted trauma-related thoughts and images; trauma-related hypervigilance; trauma-related alterations in mood)\par Goal 1: Reduce PTSD sx's by 20%, as measured by PCL-5\par \par Problem 2: Trauma-related fear and anger\par Goal 1: process emotions related to trauma event through Prolonged Exposure\par Goal 2: better understand how trauma event has affected current functioning and develop 3 coping strategies to improve functioning and utilize at least 3x/week. \par  [Prolonged Exposure] : Prolonged Exposure  [de-identified] : This writer met with patient for individual therapy. This session of Prolonged Exposure continued with imaginal exposure. This writer checked in with patient around completed home work and his SUDs following in-vivo exposures. Pt provided self-report measure scores (PCL-5= 37 indicative of PTSD; PHQ-9= 12 indicative likely of moderate depression). Pt engaged in imaginal exposure with 4 repetitions of the memory. He described feelings of fear, anger, confusion, and shock. Processed emotions with patient and distress level throughout exposure. Assigned homework.  [Recommended Frequency of Visits: ____] : Recommended frequency of visits: [unfilled] [Return in ____ week(s)] : Return in [unfilled] week(s)

## 2023-03-28 ENCOUNTER — APPOINTMENT (OUTPATIENT)
Dept: PSYCHIATRY | Facility: CLINIC | Age: 33
End: 2023-03-28

## 2023-04-04 ENCOUNTER — APPOINTMENT (OUTPATIENT)
Dept: PSYCHIATRY | Facility: CLINIC | Age: 33
End: 2023-04-04

## 2023-04-11 ENCOUNTER — APPOINTMENT (OUTPATIENT)
Dept: PSYCHIATRY | Facility: CLINIC | Age: 33
End: 2023-04-11
Payer: COMMERCIAL

## 2023-04-11 PROCEDURE — 90837 PSYTX W PT 60 MINUTES: CPT | Mod: 95

## 2023-04-12 NOTE — PHYSICAL EXAM
[Well groomed] : well groomed [Average] : average [Cooperative] : cooperative [Depressed] : depressed [Full] : full [Clear] : clear [Linear/Goal Directed] : linear/goal directed [None] : none [None Reported] : none reported [WNL] : within normal limits [FreeTextEntry8] : low mood
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
## 2023-04-18 ENCOUNTER — APPOINTMENT (OUTPATIENT)
Dept: PSYCHIATRY | Facility: CLINIC | Age: 33
End: 2023-04-18
Payer: COMMERCIAL

## 2023-04-18 PROCEDURE — 90837 PSYTX W PT 60 MINUTES: CPT | Mod: 95

## 2023-04-19 NOTE — PLAN
[FreeTextEntry2] : This writer and pt collaboratively identified the following psychotherapy goals:\par Problem 1: PTSD sx's (i.e., intrusive, unwanted trauma-related thoughts and images; trauma-related hypervigilance; trauma-related alterations in mood)\par Goal 1: Reduce PTSD sx's by 20%, as measured by PCL-5\par \par Problem 2: Trauma-related fear and anger\par Goal 1: process emotions related to trauma event through Prolonged Exposure\par Goal 2: better understand how trauma event has affected current functioning and develop 3 coping strategies to improve functioning and utilize at least 3x/week. \par  [Prolonged Exposure] : Prolonged Exposure  [de-identified] : This writer met patient for individual therapy. This session focused on reviewing homework, self-report measures, and imaginal exposure. Patient reported that his mood has been somewhat low, due to recent previous losses and stress around recent life change. In terms of in-vivo exposure homework, patient was able to effectively engage in exercises with noticeable decline in SUDs. Reiterated importance of these in-vivo exposures. Engaged in imaginal exposure, patient was able to get through 4 repetitions of the trauma memory. He reported that although he had been listening to the recent tape repeatedly, he did notice that missing several weeks of engaging in the imaginal exposure in session has affected the way today's exposure felt for him. He stated that his SUDs were higher than he thought they would be. This writer indicated that while they were elevated, they were lower than SUDs when initially started exposure. Processed emotional content post exposure. Patient described how heavy this experience weighs on him and how he questions if it will ever stop affecting him as much. This writer validated patients emotions and noted the significant growth that has taken place already. Identified ways in which he can note the growth that he has made, and understanding that this will be in smaller steps rather than completely erasing the memory of the event.  Assigned homework (in-vivo exercises, and listening to imaginal tape). [Recommended Frequency of Visits: ____] : Recommended frequency of visits: [unfilled] [Return in ____ week(s)] : Return in [unfilled] week(s)

## 2023-04-25 ENCOUNTER — APPOINTMENT (OUTPATIENT)
Dept: PSYCHIATRY | Facility: CLINIC | Age: 33
End: 2023-04-25
Payer: COMMERCIAL

## 2023-04-25 PROCEDURE — 90837 PSYTX W PT 60 MINUTES: CPT | Mod: 95

## 2023-04-25 NOTE — REASON FOR VISIT
[Home] : at home, [unfilled] , at the time of the visit. [Other Location: e.g. Home (Enter Location, City,State)___] : at [unfilled] [Self] : self [FreeTextEntry2] : Reilly Webster [Patient] : Patient [FreeTextEntry1] : PTSD sx's; work stress

## 2023-04-25 NOTE — PHYSICAL EXAM
[Well groomed] : well groomed [Average] : average [Cooperative] : cooperative [Depressed] : depressed [Angry] : angry [Full] : full [Clear] : clear [Linear/Goal Directed] : linear/goal directed [None] : none [None Reported] : none reported [WNL] : within normal limits [FreeTextEntry8] : low mood
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
## 2023-05-09 ENCOUNTER — APPOINTMENT (OUTPATIENT)
Dept: PSYCHIATRY | Facility: CLINIC | Age: 33
End: 2023-05-09
Payer: COMMERCIAL

## 2023-05-09 PROCEDURE — 90837 PSYTX W PT 60 MINUTES: CPT | Mod: 95
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
## 2023-05-10 NOTE — PHYSICAL EXAM
[Well groomed] : well groomed [Average] : average [Cooperative] : cooperative [Depressed] : depressed [Irritable] : irritable [Constricted] : constricted [Clear] : clear [Linear/Goal Directed] : linear/goal directed [None] : none [None Reported] : none reported [WNL] : within normal limits [FreeTextEntry8] : low mood

## 2023-05-10 NOTE — REASON FOR VISIT
[Home] : at home, [unfilled] , at the time of the visit. [Other Location: e.g. Home (Enter Location, City,State)___] : at [unfilled] [Self] : self [FreeTextEntry2] : Reilly Webster [Patient] : Patient [FreeTextEntry1] : PTSD sx's; depression

## 2023-05-16 ENCOUNTER — APPOINTMENT (OUTPATIENT)
Dept: PSYCHIATRY | Facility: CLINIC | Age: 33
End: 2023-05-16

## 2023-05-23 ENCOUNTER — APPOINTMENT (OUTPATIENT)
Dept: PSYCHIATRY | Facility: CLINIC | Age: 33
End: 2023-05-23
Payer: COMMERCIAL

## 2023-05-23 DIAGNOSIS — F43.10 POST-TRAUMATIC STRESS DISORDER, UNSPECIFIED: ICD-10-CM

## 2023-05-23 PROCEDURE — 90837 PSYTX W PT 60 MINUTES: CPT | Mod: 95

## 2023-05-24 PROBLEM — F43.10 PTSD (POST-TRAUMATIC STRESS DISORDER): Status: ACTIVE | Noted: 2022-11-01

## 2023-05-24 NOTE — PLAN
[FreeTextEntry2] : This writer and pt collaboratively identified the following psychotherapy goals:\par Problem 1: PTSD sx's (i.e., intrusive, unwanted trauma-related thoughts and images; trauma-related hypervigilance; trauma-related alterations in mood)\par Goal 1: Reduce PTSD sx's by 20%, as measured by PCL-5\par \par Problem 2: Trauma-related fear and anger\par Goal 1: process emotions related to trauma event through Prolonged Exposure\par Goal 2: better understand how trauma event has affected current functioning and develop 3 coping strategies to improve functioning and utilize at least 3x/week. \par  [Prolonged Exposure] : Prolonged Exposure  [de-identified] : This writer met with patient for individual therapy utilizing PE protocol. Briefly checked in with patient around current stressors that had been a primary focus on previous sessions. Provided patient with couples therapy referrals as requested. Pt completed self-report measures. Pt evidenced slight decrease on PTSD screener. Reviewed progress toward engaging in in-vivo exposures. Reiterated importance of writing down SUDs in the moment rather than reporting retrospectively. Pt stated that he continues to have some distress when asking others for help and talking about his trauma. Continued these exercises. Pt engaged in imaginal exposure of the hotspot and completed 4 repetitions. He stated that he experiences an increase in stress during the memory when he saw that people were recording him and when he was talking to his wife. Assigned homework.\par \par \par PCL-5= 28\par PHQ-9= 8 [Recommended Frequency of Visits: ____] : Recommended frequency of visits: [unfilled] [Return in ____ week(s)] : Return in [unfilled] week(s)

## 2023-06-14 ENCOUNTER — NON-APPOINTMENT (OUTPATIENT)
Age: 33
End: 2023-06-14

## 2023-06-21 ENCOUNTER — NON-APPOINTMENT (OUTPATIENT)
Age: 33
End: 2023-06-21

## 2023-06-30 ENCOUNTER — NON-APPOINTMENT (OUTPATIENT)
Age: 33
End: 2023-06-30

## 2023-07-14 ENCOUNTER — NON-APPOINTMENT (OUTPATIENT)
Age: 33
End: 2023-07-14
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
## 2024-05-16 ENCOUNTER — TRANSCRIPTION ENCOUNTER (OUTPATIENT)
Age: 34
End: 2024-05-16